# Patient Record
Sex: FEMALE | Race: WHITE | NOT HISPANIC OR LATINO | Employment: OTHER | URBAN - METROPOLITAN AREA
[De-identification: names, ages, dates, MRNs, and addresses within clinical notes are randomized per-mention and may not be internally consistent; named-entity substitution may affect disease eponyms.]

---

## 2017-08-04 ENCOUNTER — OFFICE VISIT (OUTPATIENT)
Dept: OTOLARYNGOLOGY | Facility: CLINIC | Age: 67
End: 2017-08-04
Payer: MEDICARE

## 2017-08-04 ENCOUNTER — CLINICAL SUPPORT (OUTPATIENT)
Dept: AUDIOLOGY | Facility: CLINIC | Age: 67
End: 2017-08-04
Payer: MEDICARE

## 2017-08-04 VITALS
TEMPERATURE: 99 F | WEIGHT: 192 LBS | HEART RATE: 81 BPM | DIASTOLIC BLOOD PRESSURE: 79 MMHG | SYSTOLIC BLOOD PRESSURE: 127 MMHG | HEIGHT: 67 IN | BODY MASS INDEX: 30.13 KG/M2

## 2017-08-04 DIAGNOSIS — H90.A31 MIXED CONDUCTIVE AND SENSORINEURAL HEARING LOSS OF RIGHT EAR WITH RESTRICTED HEARING OF LEFT EAR: Primary | ICD-10-CM
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
## 2017-08-04 DIAGNOSIS — Z87.898 HISTORY OF VERTIGO: Primary | ICD-10-CM

## 2017-08-04 PROCEDURE — 99999 PR PBB SHADOW E&M-NEW PATIENT-LVL III: CPT | Mod: PBBFAC,,, | Performed by: OTOLARYNGOLOGY

## 2017-08-04 PROCEDURE — 1125F AMNT PAIN NOTED PAIN PRSNT: CPT | Mod: ,,, | Performed by: OTOLARYNGOLOGY

## 2017-08-04 PROCEDURE — 99202 OFFICE O/P NEW SF 15 MIN: CPT | Mod: S$PBB,,, | Performed by: OTOLARYNGOLOGY

## 2017-08-04 PROCEDURE — 99203 OFFICE O/P NEW LOW 30 MIN: CPT | Mod: PBBFAC,25 | Performed by: OTOLARYNGOLOGY

## 2017-08-04 PROCEDURE — 3008F BODY MASS INDEX DOCD: CPT | Mod: ,,, | Performed by: OTOLARYNGOLOGY

## 2017-08-04 PROCEDURE — 1159F MED LIST DOCD IN RCRD: CPT | Mod: ,,, | Performed by: OTOLARYNGOLOGY

## 2017-08-04 NOTE — LETTER
August 6, 2017      Camila Duarte MD  [facility] [address]           [facility] - Otorhinolaryngology  [address]  [place] [address]  Phone: [phone]  Fax: [phone]          Patient: Lakeisha Oleary   MR Number: [id number]   YOB: 1950   Date of Visit: 8/4/2017       Dear Dr. Camila Duarte:    Thank you for referring Lakeisha Oleary to me for evaluation. Attached you will find relevant portions of my assessment and plan of care.    If you have questions, please do not hesitate to call me. I look forward to following Lakeisha Oleary along with you.    Sincerely,    Ryan Sherman III, MD    Enclosure  CC:  No Recipients    If you would like to receive this communication electronically, please contact [email or web address] or [phone] to request more information on [facility] Link access.    For providers and/or their staff who would like to refer a patient to [facility], please contact us through our one-stop-shop provider referral line, [facility], at [phone].    If you feel you have received this communication in error or would no longer like to receive these types of communications, please e-mail [email or web address]
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
## 2017-08-04 NOTE — PATIENT INSTRUCTIONS
Audiometry reviewed: AD > AS HL  Pt will schedule for [place] [facility] testing scheduled 3 days from now; vertigo workup literature provided
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
## 2017-08-07 ENCOUNTER — CLINICAL SUPPORT (OUTPATIENT)
Dept: AUDIOLOGY | Facility: CLINIC | Age: 67
End: 2017-08-07
Payer: MEDICARE

## 2017-08-07 DIAGNOSIS — R42 DIZZINESS: Primary | ICD-10-CM

## 2017-08-07 PROCEDURE — 92542 POSITIONAL NYSTAGMUS TEST: CPT | Mod: 26,59,S$PBB, | Performed by: AUDIOLOGIST-HEARING AID FITTER

## 2017-08-07 PROCEDURE — 92542 POSITIONAL NYSTAGMUS TEST: CPT | Mod: PBBFAC,59 | Performed by: AUDIOLOGIST-HEARING AID FITTER

## 2017-08-07 PROCEDURE — 92541 SPONTANEOUS NYSTAGMUS TEST: CPT | Mod: 26,S$PBB,, | Performed by: AUDIOLOGIST-HEARING AID FITTER

## 2017-08-07 PROCEDURE — 92541 SPONTANEOUS NYSTAGMUS TEST: CPT | Mod: PBBFAC | Performed by: AUDIOLOGIST-HEARING AID FITTER
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
